# Patient Record
Sex: FEMALE | Race: BLACK OR AFRICAN AMERICAN
[De-identification: names, ages, dates, MRNs, and addresses within clinical notes are randomized per-mention and may not be internally consistent; named-entity substitution may affect disease eponyms.]

---

## 2020-10-27 ENCOUNTER — HOSPITAL ENCOUNTER (EMERGENCY)
Dept: HOSPITAL 97 - ER | Age: 20
Discharge: HOME | End: 2020-10-27
Payer: SELF-PAY

## 2020-10-27 VITALS — OXYGEN SATURATION: 100 % | TEMPERATURE: 98.2 F

## 2020-10-27 VITALS — DIASTOLIC BLOOD PRESSURE: 84 MMHG | SYSTOLIC BLOOD PRESSURE: 142 MMHG

## 2020-10-27 DIAGNOSIS — J30.9: Primary | ICD-10-CM

## 2020-10-27 PROCEDURE — 99284 EMERGENCY DEPT VISIT MOD MDM: CPT

## 2020-10-27 PROCEDURE — 87804 INFLUENZA ASSAY W/OPTIC: CPT

## 2020-10-27 PROCEDURE — 96372 THER/PROPH/DIAG INJ SC/IM: CPT

## 2020-10-27 NOTE — ER
Nurse's Notes                                                                                     

 [facility]                                                                 

Name: Michaela Toscano                                                                              

Age: 20 yrs                                                                                       

Sex: Female                                                                                       

: 2000                                                                                   

MRN: [id number]                                                                                   

Arrival Date: 10/27/2020                                                                          

Time: 10:                                                                                       

Account#: [id number]                                                                            

Bed 23                                                                                            

Private MD:                                                                                       

Diagnosis: Allergic rhinitis, unspecified                                                         

                                                                                                  

Presentation:                                                                                     

10/27                                                                                             

10:44 Chief complaint: Patient states: has had allergy symptoms since the weekend, has        iw  

      congestion, sneezing, also has asthma and had increased difficulty breathing. Risk          

      Assessment: Do you want to hurt yourself or someone else? Patient reports no desire to      

      harm self or others.                                                                        

10:44 Method Of Arrival: Ambulatory                                                           iw  

10:44 Acuity: CLARE 4                                                                           iw  

10:45 Ebola Screen: Patient negative for fever greater than or equal to 101.5 degrees         iw  

      Fahrenheit, and additional compatible Ebola Virus Disease symptoms Patient denies           

      exposure to infectious person. Patient denies travel to an Ebola-affected area in the       

      21 days before illness onset. No symptoms or risks identified at this time. Initial         

      Sepsis Screen: Does the patient meet any 2 criteria? No. Patient's initial sepsis           

      screen is negative. Does the patient have a suspected source of infection? No.              

      Patient's initial sepsis screen is negative. Onset of symptoms was 2020.        

12:40 Coronavirus screen: At this time, the client does not indicate any symptoms associated  jd3 

      with coronavirus-19.                                                                        

                                                                                                  

OB/GYN:                                                                                           

12:40 LMP N/A - Irregular menses                                                              jd3 

                                                                                                  

Historical:                                                                                       

- Allergies:                                                                                      

10:46 No Known Allergies;                                                                     iw  

- Home Meds:                                                                                      

10:46 None [Active];                                                                          iw  

- PMHx:                                                                                           

10:46 Asthma;                                                                                 iw  

- PSHx:                                                                                           

10:46 Knee surgery;                                                                           iw  

                                                                                                  

- Immunization history:: Adult Immunizations.                                                     

- Social history:: Smoking status: Patient denies any tobacco usage or history of.                

                                                                                                  

                                                                                                  

Screenin:40 Abuse screen: Denies threats or abuse. Nutritional screening: No deficits noted.        jd3 

      Tuberculosis screening: No symptoms or risk factors identified. Fall Risk Ambulatory        

      Aid- None/Bed Rest/Nurse Assist (0 pts). Gait- Normal/Bed Rest/Wheelchair (0 pts)           

      Mental Status- Oriented to own ability (0 pts). Total Paiz Fall Scale indicates No         

      Risk (0-24 pts).                                                                            

                                                                                                  

Assessment:                                                                                       

10:47 General: Appears in no apparent distress. Behavior is calm, cooperative. Pain: Denies   iw  

      pain. Neuro: Level of Consciousness is awake, alert, obeys commands, Oriented to            

      person, place, time, situation. Respiratory: Respiratory effort is even, unlabored,         

      Respiratory pattern is regular, symmetrical. Derm: Skin is intact, is healthy with good     

      turgor.                                                                                     

12:41 Reassessment: Patient appears in no apparent distress at this time. No changes from     jd3 

      previously documented assessment. Patient and/or family updated on plan of care and         

      expected duration. Pain level reassessed. Patient is alert, oriented x 3, equal             

      unlabored respirations, skin warm/dry/pink.                                                 

                                                                                                  

Vital Signs:                                                                                      

10:45  / 102; Pulse 72; Resp 18 S; Temp 98.2; Pulse Ox 100% on R/A;                     iw  

12:49  / 84; Pulse 71; Resp 16 S; Pulse Ox 100% on R/A;                                 jd3 

                                                                                                  

ED Course:                                                                                        

10:31 Patient arrived in ED.                                                                  ag5 

10:35 Britta Thorne FNP-C is [facility].                                                        kb  

10:35 Marquez Shelby MD is Attending Physician.                                             kb  

10:45 Triage completed.                                                                       iw  

10:46 Ara Wolfe, RN is Primary Nurse.                                                   iw  

10:46 Arm band placed on.                                                                     iw  

11:02 Flu Sent.                                                                               iw  

12:40 Patient has correct armband on for positive identification. Bed in low position. Call   jd3 

      light in reach. Side rails up X 1. Pulse ox on. NIBP on.                                    

12:40 No provider procedures requiring assistance completed. Patient did not have IV access   jd3 

      during this emergency room visit.                                                           

                                                                                                  

Administered Medications:                                                                         

11:25 Drug: Decadron 10 mg Route: IM; Site: right ventrogluteal;                              iw  

12:50 Follow up: Response: No adverse reaction                                                jd3 

                                                                                                  

                                                                                                  

Outcome:                                                                                          

12:33 Discharge ordered by MD.                                                                kb  

12:41 Condition: stable                                                                       jd3 

12:49 Discharged to home ambulatory.                                                          jd3 

12:49 Discharge instructions given to patient, Instructed on discharge instructions, follow       

      up and referral plans. medication usage, Demonstrated understanding of instructions,        

      follow-up care, medications, Prescriptions given X 1.                                       

12:50 Patient left the ED.                                                                    jd3 

                                                                                                  

Signatures:                                                                                       

Britta Thorne FNP-C FNP-Ara Neves RN                     Moncho Ocampo RN RN   j                                                  

Catherine, Ajare                                ag5                                                  

                                                                                                  

**************************************************************************************************

## 2020-10-27 NOTE — EDPHYS
Physician Documentation                                                                           

 [facility]                                                                 

Name: Michaela Toscano                                                                              

Age: 20 yrs                                                                                       

Sex: Female                                                                                       

: 2000                                                                                   

MRN: [id number]                                                                                   

Arrival Date: 10/27/2020                                                                          

Time: 10:                                                                                       

Account#: [id number]                                                                            

Bed 23                                                                                            

Private MD:                                                                                       

ED Physician Marquez Shelby                                                                      

HPI:                                                                                              

10/27                                                                                             

11:10 This 20 yrs old Black Female presents to ER via Ambulatory with complaints of Allergy   kb  

      Symptoms.                                                                                   

11:10 The patient or guardian reports difficulty breathing. Severity of symptoms: At their    kb  

      worst the symptoms were moderate, in the emergency department the symptoms are              

      unchanged. Associated signs and symptoms: Pertinent positives: rhinorrhea, Pertinent        

      negatives: chest pain, diarrhea, ear ache, fever, nausea, sore throat, vomiting. The        

      patient has experienced similar episodes in the past. The patient has not recently seen     

      a physician.                                                                                

11:10 Onset: The symptoms/episode began/occurred 2 day(s) ago. Modifying factors: The         kb  

      symptoms are alleviated by nothing, the symptoms are aggravated by nothing. Pt reports      

      she has a history of allergies and asthma. States she has not been taking her allergy       

      medication and was outside all weekend. States she started to get congested after that      

      and yesterday wasn't breathing right. Today symptoms are better, but she still has          

      nasal congestion.                                                                           

                                                                                                  

OB/GYN:                                                                                           

12:40 LMP N/A - Irregular menses                                                              jd3 

                                                                                                  

Historical:                                                                                       

- Allergies:                                                                                      

10:46 No Known Allergies;                                                                     iw  

- Home Meds:                                                                                      

10:46 None [Active];                                                                          iw  

- PMHx:                                                                                           

10:46 Asthma;                                                                                 iw  

- PSHx:                                                                                           

10:46 Knee surgery;                                                                           iw  

                                                                                                  

- Immunization history:: Adult Immunizations.                                                     

- Social history:: Smoking status: Patient denies any tobacco usage or history of.                

                                                                                                  

                                                                                                  

ROS:                                                                                              

11:09 Constitutional: Negative for fever, chills, and weight loss, Cardiovascular: Negative   kb  

      for chest pain, palpitations, and edema, Abdomen/GI: Negative for abdominal pain,           

      nausea, vomiting, diarrhea, and constipation, Back: Negative for injury and pain,           

      MS/Extremity: Negative for injury and deformity, Skin: Negative for injury, rash, and       

      discoloration, Neuro: Negative for headache, weakness, numbness, tingling, and seizure.     

11:09 ENT: Positive for rhinorrhea, sinus congestion.                                             

11:09 Respiratory: Positive for shortness of breath, Negative for cough, dyspnea on exertion,     

      hemoptysis, orthopnea, pleurisy, sputum production, wheezing.                               

                                                                                                  

Exam:                                                                                             

11:09 Constitutional:  This is a well developed, well nourished patient who is awake, alert,  kb  

      and in no acute distress. Head/Face:  Normocephalic, atraumatic. ENT:  Nares patent. No     

      nasal discharge, no septal abnormalities noted.  Tympanic membranes are normal and          

      external auditory canals are clear.  Oropharynx with no redness, swelling, or masses,       

      exudates, or evidence of obstruction, uvula midline.  Mucous membranes moist. Neck:         

      Trachea midline, no thyromegaly or masses palpated, and no cervical lymphadenopathy.        

      Supple, full range of motion without nuchal rigidity, or vertebral point tenderness.        

      No Meningismus. Chest/axilla:  Normal chest wall appearance and motion.  Nontender with     

      no deformity.  No lesions are appreciated. Cardiovascular:  Regular rate and rhythm         

      with a normal S1 and S2.  No gallops, murmurs, or rubs.  Normal PMI, no JVD.  No pulse      

      deficits. Respiratory:  Lungs have equal breath sounds bilaterally, clear to                

      auscultation and percussion.  No rales, rhonchi or wheezes noted.  No increased work of     

      breathing, no retractions or nasal flaring. Abdomen/GI:  Soft, non-tender, with normal      

      bowel sounds.  No distension or tympany.  No guarding or rebound.  No evidence of           

      tenderness throughout. Skin:  Warm, dry with normal turgor.  Normal color with no           

      rashes, no lesions, and no evidence of cellulitis. MS/ Extremity:  Pulses equal, no         

      cyanosis.  Neurovascular intact.  Full, normal range of motion. Neuro:  Awake and           

      alert, GCS 15, oriented to person, place, time, and situation.  Cranial nerves II-XII       

      grossly intact.  Motor strength 5/5 in all extremities.  Sensory grossly intact.            

      Cerebellar exam normal.  Normal gait.                                                       

                                                                                                  

Vital Signs:                                                                                      

10:45  / 102; Pulse 72; Resp 18 S; Temp 98.2; Pulse Ox 100% on R/A;                     iw  

12:49  / 84; Pulse 71; Resp 16 S; Pulse Ox 100% on R/A;                                 jd3 

                                                                                                  

MDM:                                                                                              

10:46 Patient medically screened.                                                             kb  

11:09 Data reviewed: vital signs, nurses notes. Data interpreted: Pulse oximetry: on room air kb  

      is 100 %. Interpretation: normal.                                                           

11:59 ED course: Lab says it will be another 30 minutes before flu test is resulted. .        kb  

12:33 Counseling: I had a detailed discussion with the patient and/or guardian regarding: the kb  

      historical points, exam findings, and any diagnostic results supporting the                 

      discharge/admit diagnosis, lab results, the need for outpatient follow up, a family         

      practitioner, to return to the emergency department if symptoms worsen or persist or if     

      there are any questions or concerns that arise at home.                                     

                                                                                                  

10/27                                                                                             

10:46 Order name: Flu                                                                           

10/27                                                                                             

12:30 Order name: Influenza Screen (A ; Complete Time: 12:33                                  EDMS

                                                                                                  

Administered Medications:                                                                         

11:25 Drug: Decadron 10 mg Route: IM; Site: right ventrogluteal;                                

12:50 Follow up: Response: No adverse reaction                                                jd3 

                                                                                                  

                                                                                                  

Disposition:                                                                                      

10/28                                                                                             

11:54 Co-signature as Attending Physician, Marquez Shelby MD I agree with the assessment and  agnieszka 

      plan of care.                                                                               

                                                                                                  

Disposition:                                                                                      

10/27/20 12:33 Discharged to Home. Impression: Allergic rhinitis, unspecified.                    

- Condition is Stable.                                                                            

- Discharge Instructions: Allergies, Easy-to-Read.                                                

- Prescriptions for Albuterol Sulfate 90 mcg/actuation - inhale 1-2 puff by INHALATION            

  route every 4-6 hours; 1 Inhaler.                                                               

- Medication Reconciliation Form, Thank You Letter, Antibiotic Education, Prescription            

  Opioid Use, Work release form form.                                                             

- Follow up: Emergency Department; When: As needed; Reason: Worsening of condition.               

  Follow up: Private Physician; When: 2 - 3 days; Reason: Recheck today's complaints,             

  Continuance of care, Re-evaluation by your physician.                                           

                                                                                                  

                                                                                                  

                                                                                                  

Signatures:                                                                                       

Dispatcher [employer]                           [facility]                                                 

Britta Thorne, Marquez Joyce MD MD cha Williams, Irene, RN                     Moncho Ocampo RN                    RN   jd3                                                  

                                                                                                  

Corrections: (The following items were deleted from the chart)                                    

10/27                                                                                             

12:50 12:33 10/27/2020 12:33 Discharged to Home. Impression: Allergic rhinitis, unspecified.  jd3 

      Condition is Stable. Forms are Medication Reconciliation Form, Thank You Letter,            

      Antibiotic Education, Prescription Opioid Use. Follow up: Emergency Department; When:       

      As needed; Reason: Worsening of condition. Follow up: Private Physician; When: 2 - 3        

      days; Reason: Recheck today's complaints, Continuance of care, Re-evaluation by your        

      physician. kb                                                                               

                                                                                                  

**************************************************************************************************

## 2020-10-27 NOTE — XMS REPORT
Continuity of Care Document

                           Created on:2020



Patient:JOANNA WINSTON

Sex:Female

:2000

External Reference #:[id number]





Demographics







                          Address                   [address]



                                                    [place] [address]

 

                          Home Phone                [phone]

 

                          Preferred Language        en-US

 

                          Marital Status            Unknown

 

                          Hindu Affiliation     Unknown

 

                          Race                      Unknown

 

                          Ethnic Group              Unknown









Author







                          Organization              [id number]









Care Team Providers







                    Name                Role                Phone

 

                    [id number] Unavailable         Un

available









Problems







          Problem   Status    Onset     Classification Date      Comments  Sourc

e



                              Date                Reported            



                                                                      



                                                                      

 

          Morbid    Active              Problem   10/11/2019            [facility]

l



          obesity                                                     Group



          (disorder)                                                   



                                                                      



                                                                      







Medications







        Medication Details Route   Status  Patient Ordering Order   Source



                                        Instructions Provider Date    



                                                                



                                                                



                                                                

 

        Rocephin 1,000 mg,         Inactive                 10/08/2 



                Route: IM,                                 019     [facility]



                [facility],                                           Group



                Dosing                                          



                Weight                                          



                137.472,                                         



                kg, Start                                         



                date:                                           



                10/08/19                                         



                11:22:00                                         



                CDT, Stop                                         



                date:                                           



                10/08/19                                         



                11:22:00                                         



                CDT                                             



                                                                



                                                                

 

        Dexamethasone 10 mg,          Inactive                 10/08/2 



                Route: IM,                                 019     Medical



                [facility],                                           Group



                Dosing                                          



                Weight                                          



                137.472,                                         



                kg,                                             



                Priority:                                         



                STAT, Start                                         



                date:                                           



                10/08/19                                         



                11:22:00                                         



                CDT, Stop                                         



                date:                                           



                10/08/19                                         



                11:22:00                                         



                CDT                                             



                                                                



                                                                

 

        amoxicillin 875 875 mg = 1         Active                  10/08/2 



        mg oral tablet tab, PO,                                 019     Medical



                Q12H, X 10                                         Group



                day, # 20                                         



                tab, 0                                          



                Refill(s),                                         



                Pharmacy:                                         



                [facility]                                         



                [facility]                                         



                [id number]                                            



                                                                



                                                                

 

        200 ACTUAT 1 puff,         Active                   



        Albuterol 0.09 INHALER,                                 019     Medical



        MG/ACTUAT Q4H, PRN                                         Group



        Metered Dose for                                             



        Inhaler [ProAir wheezing, #                                         



        HFA]    8.5 gm, 0                                         



                Refill(s),                                         



                Pharmacy:                                         



                [facility]                                         



                [facility]                                         



                [id number]                                            



                                                                



                                                                

 

        Azithromycin 5 See             Active                   



        Day Dose Pack Instruction                                 019     [facility]

l



        250 mg oral s, Take 2                                         Group



        tablet  tablets by                                         



                mouth the                                         



                first day                                         



                then 1                                          



                tablet by                                         



                mouth days                                         



                2-5., X 5                                         



                day, # 6                                         



                tab, 0                                          



                Refill(s)                                         



                                                                



                                                                

 

        {21     See             Active                   



        (Methylprednisol Instruction                                 019     Med

ical



        one 4 MG Oral s, PO, Take                                         Group



        Tablet [Medrol]) by mouth as                                         



        } Pack [Medrol directed on                                         



        Dosepak] label., # 1                                         



                Pack, 0                                         



                Refill(s),                                         



                Pharmacy:                                         



                [facility]                                         



                [facility]acy                                         



                [id number]                                            



                                                                



                                                                

 

        albuterol 90 2 puff,         Active                   



        mcg/inh INHALATION,                                 019     Medical



        inhalation QID, 0                                          Group



        aerosol Refill(s)                                         



                                                                



                                                                







Allergies, Adverse Reactions, Alerts







        Substance Category Reaction Severity Reaction Status  Date    Comments S

ource



                                        type            Reported         



                                                                        



                                                                        



                                                                        

 

        No Known Assertion                 Drug                            



        Medication                         allergy                         Medic

al



        Allergies                                                         Group



                                                                        



                                                                        







Immunizations







                                        No Data Provided for This Section







Results







                                        No Data Provided for This Section







Pathology Reports







                                        No Data Provided for This Section







Diagnostic Reports







                Report          Value           Date            Source



                                                                

 

                Chest 2 views DX Clinical Indication:  - cough and chest pain. 0

2019      

[facility]



                                Comparison: None                 



                                Findings:                       



                                Frontal and lateral views of the chest obtained.

                 



                                The cardiac silhouette is normal.               

  



                                There is no lobar consolidation, effusion, edema

.  No pneumothorax.                 



                                No acute osseous abnormality.                 



                                IMPRESSION:                     



                                No acute cardiopulmonary abnormality.           

      



                                                                



                                                                



                                SL:  [id number]                    



                                                                







Consultation Notes







                                        No Data Provided for This Section







Discharge Summaries







                                        No Data Provided for This Section







History and Physicals







                                        No Data Provided for This Section







Vital Signs







             Vital Sign   Value        Date         Comments     Source



                                                                 

 

             Systolic (mm Hg) 137          10/08/2019                 Medical 

Group



                                                                 



                                                                 

 

             Diastolic (mm Hg) 96           10/08/2019                 Medical

 Group



                                                                 



                                                                 

 

             Heart Rate   86           10/08/2019                 [facility] Gr[facility]

p



                                                                 



                                                                 

 

             Temperature Oral (F) 98.3 F       10/08/2019                 Medi

[facility] Group



                                                                 



                                                                 

 

             Height       172.72 cm    10/08/2019                 [facility]

p



                                                                 



                                                                 

 

             Weight       137.472      10/08/2019                 [facility]

p



                                                                 



                                                                 

 

             BMI Calculated 46.08        10/08/2019                 Medical Gr

oup



                                                                 



                                                                 

 

             BMI Calculated 47.5         2019                 Medical Gr

oup



                                                                 



                                                                 

 

             Weight       145.909      2019                 [facility] Gr[facility]

p



                                                                 



                                                                 

 

             Height       175.26 cm    2019                 [facility] Gr[facility]

p



                                                                 



                                                                 

 

             Systolic (mm Hg) 138          2019                 Medical 

Group



                                                                 



                                                                 

 

             Diastolic (mm Hg) 91           2019                 Medical

 Group



                                                                 



                                                                 

 

             Temperature Oral (F) 98.6 F       2019                 Medi

jatin[facility] Group



                                                                 



                                                                 

 

             Heart Rate   88           2019                 [facility] Gr[facility]

p



                                                                 



                                                                 







Encounters







       Location Location Encounter Encounter Reason Attending ADM    [place]     [facility]

 Source



              Details Type   Number For    Provider Date   Date          



                                   Visit                              



                                                                      



                                                                      



                                                                      

 

                     Outpatient [id number]                 [facility]                  [facility] Urgent        Outpatient [id number]              

   



       Care                               [place]         [facility]



       [facility]                                                          Group



       [place]                                                           



                                                                      



                                                                      

 

                     Outpatient [id number]        NISHA          Active 

[facility]



                                                          [facility]          [facility] [id number]        Nisha          





       Primary        Pre-Reg               [facility]         Medic

al



       Care                                                           Group



       [facility]                                                          



       [place]                                                           



                                                                      



                                                                      

 

                     Outpatient [id number]        [facility]         [facility]

[facility]arm[facility]



                                                          [facility] Urgent        Outpatient [id number]        [facility] 10/08  10/09  

       



       Care                                        Medical



       Clear                                                          Group



       [place]                                                           



                                                                      



                                                                      







Procedures







                                        No Data Provided for This Section







Assessment and Plan







                                        No Data Provided for This Section







Plan of Care







                                        No Data Provided for This Section







Social History







                    Social History      Date                Source



                                                            

 

                    Social History TypeResponse 10/08/2019           [facility]

roup



                    Smoking Status                          



                                        Never smoker; Ready to change: No; Maryann

rns about tobacco use in household: No; 

Exposure to Tobacco Smoke None; Cigarette Smoking Last 365 Days No; Reg Smoking 
Cessation Counseling No                             



                    entered on: 10/8/19                     



                                                            







Family History







                                        No Data Provided for This Section







Advance Directives







                                        No Data Provided for This Section







Functional Status







                                        No Data Provided for This Section

## 2022-07-13 ENCOUNTER — HOSPITAL ENCOUNTER (EMERGENCY)
Dept: HOSPITAL 97 - ER | Age: 22
LOS: 1 days | Discharge: HOME | End: 2022-07-14
Payer: SELF-PAY

## 2022-07-13 DIAGNOSIS — J06.9: ICD-10-CM

## 2022-07-13 DIAGNOSIS — U07.1: Primary | ICD-10-CM

## 2022-07-13 PROCEDURE — 99282 EMERGENCY DEPT VISIT SF MDM: CPT

## 2022-07-13 PROCEDURE — 87804 INFLUENZA ASSAY W/OPTIC: CPT

## 2022-07-14 VITALS — TEMPERATURE: 98.2 F | OXYGEN SATURATION: 100 %

## 2022-07-14 VITALS — DIASTOLIC BLOOD PRESSURE: 99 MMHG | SYSTOLIC BLOOD PRESSURE: 137 MMHG

## 2022-07-14 NOTE — EDPHYS
Physician Documentation                                                                           

 [facility]                                                                 

Name: Michaela Toscano                                                                              

Age: 22 yrs                                                                                       

Sex: Female                                                                                       

: 2000                                                                                   

MRN: [id number]                                                                                   

Arrival Date: 2022                                                                          

Time: 20:56                                                                                       

Account#: [id number]                                                                            

Bed Waiting                                                                                       

Private MD:                                                                                       

ED Physician Marlon Boyd                                                                         

HPI:                                                                                              

                                                                                             

23:40 This 22 yrs old Black Female presents to ER via Ambulatory with complaints of Allergy   cp  

      Symptoms, Breathing Difficulty, Cough.                                                      

                                                                                                  

OB/GYN:                                                                                           

21:28 LMP 2022                                                                           vc1 

                                                                                                  

Historical:                                                                                       

- Allergies:                                                                                      

21:29 No Known Allergies;                                                                     vc1 

- Home Meds:                                                                                      

21:29 None [Active];                                                                          vc1 

- PMHx:                                                                                           

21:29 Asthma;                                                                                 vc1 

- PSHx:                                                                                           

21:29 None;                                                                                   vc1 

                                                                                                  

- Immunization history:: Adult Immunizations up to date, Client reports receiving the             

  2nd dose of the Covid vaccine.                                                                  

- Social history:: Smoking status: Patient denies any tobacco usage or history of.                

                                                                                                  

                                                                                                  

ROS:                                                                                              

23:45 Constitutional: Negative for body aches, chills, fever, poor PO intake.                 cp  

23:45 Eyes: Negative for injury, pain, redness, and discharge.                                cp  

23:45 ENT: Positive for rhinorrhea, sore throat, Negative for drainage from ear(s), ear pain,     

      difficulty swallowing, difficulty handling secretions.                                      

23:45 Cardiovascular: Negative for chest pain, palpitations.                                      

23:45 Respiratory: Positive for cough.                                                            

23:45 Abdomen/GI: Negative for abdominal pain, nausea, vomiting, and diarrhea.                    

23:45 Neuro: Negative for altered mental status, headache, weakness.                              

23:45 All other systems are negative.                                                             

                                                                                                  

Exam:                                                                                             

23:50 Constitutional: The patient appears in no acute distress, alert, awake, non-toxic, well cp  

      developed, well nourished.                                                                  

23:50 Head/Face:  Normocephalic, atraumatic.                                                  cp  

23:50 Eyes: Periorbital structures: appear normal, Conjunctiva: normal, no exudate, no            

      injection, Sclera: no appreciated abnormality, Lids and lashes: appear normal,              

      bilaterally.                                                                                

23:50 ENT: External ear(s): are unremarkable, Ear canal(s): are normal, clear, TM's: bulging,     

      is not appreciated, bilaterally, dullness, bilaterally, erythema, is not appreciated,       

      bilaterally, Nose: is normal, Mouth: Lips: moist, Oral mucosa: pink and intact, moist,      

      Posterior pharynx: Airway: no evidence of obstruction, patent, Tonsils: no enlargement,     

      no exudate, erythema, that is mild, exudate, is not appreciated.                            

23:50 Neck: ROM/movement: is normal, is supple, without pain, no range of motions                 

      limitations, no meningismus.                                                                

23:50 Chest/axilla: Inspection: normal.                                                           

23:50 Cardiovascular: Rate: normal, Rhythm: regular.                                              

23:50 Respiratory: the patient does not display signs of respiratory distress,  Respirations:     

      normal, no use of accessory muscles, no retractions, labored breathing, is not present,     

      Breath sounds: are clear throughout, no decreased breath sounds, no stridor, no             

      wheezing.                                                                                   

23:50 Abdomen/GI: Exam negative for discomfort, distension, guarding, Inspection: abdomen         

      appears normal.                                                                             

23:50 Back: pain, is absent, ROM is normal.                                                       

23:50 Skin: no rash present.                                                                      

                                                                                                  

Vital Signs:                                                                                      

21:22 Pulse 89; Resp 20; Temp 98.2; Pulse Ox 100% ; Weight 145.15 kg; Height 5 ft. 9 in.      vc1 

      (175.26 cm); Pain 0/10;                                                                     

21:28  / 99;                                                                            vc1 

21:22 Body Mass Index 47.26 (145.15 kg, 175.26 cm)                                            vc1 

                                                                                                  

MDM:                                                                                              

23:50 Differential diagnosis: bronchitis, flu, URI, Covid-19.                                 cp  

                                                                                             

00:15 Patient medically screened.                                                             cp  

00:15 Data reviewed: vital signs, nurses notes, lab test result(s), and as a result, I will   cp  

      discharge patient.                                                                          

00:15 ED course: VSS. Patient appears non-toxic and no signs of respiratory distress. Patient cp  

      declines to continue to wait for results of COVID-19 test.                                  

                                                                                                  

                                                                                             

21:28 Order name: Flu; Complete Time: 23:49                                                   vc1 

                                                                                             

23:31 Order name: SARS-COV-2 RT PCR; Complete Time: 23:49                                     EDMS

                                                                                                  

Administered Medications:                                                                         

No medications were administered                                                                  

                                                                                                  

                                                                                                  

Disposition Summary:                                                                              

22 00:15                                                                                    

Discharge Ordered                                                                                 

      Location: Home                                                                          cp  

      Problem: new                                                                            cp  

      Symptoms: are unchanged                                                                 cp  

      Condition: Stable                                                                       cp  

      Diagnosis                                                                                   

        - Acute upper respiratory infection, unspecified                                      cp  

      Followup:                                                                               cp  

        - With: Private Physician                                                                  

        - When: 2 - 3 days                                                                         

        - Reason: Worsening of condition                                                           

      Discharge Instructions:                                                                     

        - Discharge Summary Sheet                                                             cp  

        - Viral Respiratory Infection                                                         cp  

      Forms:                                                                                      

        - Medication Reconciliation Form                                                      cp  

        - Thank You Letter                                                                    cp  

        - Antibiotic Education                                                                cp  

        - Prescription Opioid Use                                                             cp  

      Prescriptions:                                                                              

        - Bromfed DM 2-30-10 mg/5 mL Oral syrup                                                    

            - take 10 milliliter by ORAL route every 6 hours; 180 milliliter; Refills: 0,     cp  

      Product Selection Permitted                                                                 

Addendum:                                                                                         

2022                                                                                        

     21:37 Co-signature as Attending Physician, Marlon BROUSSARD was immediately available on-site m
s3

           in the Emergency Department for consultation in the care of the patient. .             

                                                                                                  

Signatures:                                                                                       

Dispatcher [employer]                           EDMS                                                 

Marquez Xavier PA PA cp Sims, Marcus, DO DO   ms3                                                  

Carla Glass RN                    RN   vc1                                                  

                                                                                                  

Corrections: (The following items were deleted from the chart)                                    

                                                                                             

23:31 21:28 COVID,FLU,RSV CPL+MR.LAB.BRZ ordered. EDMS                                        EDMS

                                                                                             

23:50  23:35 Differential diagnosis: bronchitis, flu, URI, Covid-19 cp                   cp  

                                                                                                  

**************************************************************************************************

## 2022-07-14 NOTE — ER
Nurse's Notes                                                                                     

 [facility]                                                                 

Name: Michaela Toscano                                                                              

Age: 22 yrs                                                                                       

Sex: Female                                                                                       

: 2000                                                                                   

MRN: [id number]                                                                                   

Arrival Date: 2022                                                                          

Time: 20:56                                                                                       

Account#: [id number]                                                                            

Bed Waiting                                                                                       

Private MD:                                                                                       

Diagnosis: Acute upper respiratory infection, unspecified                                         

                                                                                                  

Presentation:                                                                                     

                                                                                             

21:22 Chief complaint: Patient states: " I usually have allergies and I have been taking my   vc1 

      allergies since . It started in my nose and now it is draining down to my throat      

      and making me cough. I took mucinex and it helped at first but I took it today and it       

      didn't seem like it helped today.". Coronavirus screen: Vaccine status: Patient reports     

      receiving the 2nd dose of the covid vaccine. Pfizer congestion, cough unrelated to          

      allergies, fever, runny nose. Ebola Screen: No symptoms or risks identified at this         

      time.                                                                                       

21:22 Method Of Arrival: Ambulatory                                                           vc1 

21:26 Initial Sepsis Screen: Does the patient meet any 2 criteria? No. Patient's initial      vc1 

      sepsis screen is negative. Does the patient have a suspected source of infection? No.       

      Patient's initial sepsis screen is negative. Risk Assessment: Do you want to hurt           

      yourself or someone else? Patient reports no desire to harm self or others. Onset of        

      symptoms was July 10, 2022.                                                                 

21:26 Acuity: CLARE 4                                                                           vc1 

                                                                                                  

Triage Assessment:                                                                                

                                                                                             

00:26 General: Appears in no apparent distress. uncomfortable, ill, Behavior is calm,         vc1 

      cooperative, appropriate for age. Pain: Denies pain.                                        

                                                                                                  

OB/GYN:                                                                                           

                                                                                             

21:28 LMP 2022                                                                           vc1 

                                                                                                  

Historical:                                                                                       

- Allergies:                                                                                      

21:29 No Known Allergies;                                                                     vc1 

- Home Meds:                                                                                      

21:29 None [Active];                                                                          vc1 

- PMHx:                                                                                           

21:29 Asthma;                                                                                 vc1 

- PSHx:                                                                                           

21:29 None;                                                                                   vc1 

                                                                                                  

- Immunization history:: Adult Immunizations up to date, Client reports receiving the             

  2nd dose of the Covid vaccine.                                                                  

- Social history:: Smoking status: Patient denies any tobacco usage or history of.                

                                                                                                  

                                                                                                  

Screenin:30 Abuse screen: Denies threats or abuse. Nutritional screening: No deficits noted.        vc1 

      Tuberculosis screening: No symptoms or risk factors identified.                             

21:30 Fall Risk None identified.                                                              vc1 

                                                                                                  

Vital Signs:                                                                                      

21:22 Pulse 89; Resp 20; Temp 98.2; Pulse Ox 100% ; Weight 145.15 kg; Height 5 ft. 9 in.      vc1 

      (175.26 cm); Pain 0/10;                                                                     

21:28  / 99;                                                                            vc1 

21:22 Body Mass Index 47.26 (145.15 kg, 175.26 cm)                                            vc1 

                                                                                                  

ED Course:                                                                                        

20:56 Patient arrived in ED.                                                                  ja2 

20:57 Marquez Xavier PA is PHCP.                                                                cp  

20:57 Marlon Boyd DO is Attending Physician.                                                cp  

21:27 Triage completed.                                                                       vc1 

21:28 Arm band placed on right wrist.                                                         vc1 

                                                                                             

00:27 Patient has correct armband on for positive identification.                             vc1 

00:27 No provider procedures requiring assistance completed. Patient did not have IV access   vc1 

      during this emergency room visit.                                                           

                                                                                                  

Administered Medications:                                                                         

No medications were administered                                                                  

                                                                                                  

                                                                                                  

Medication:                                                                                       

00:27 VIS not applicable for this client.                                                     vc1 

                                                                                                  

Outcome:                                                                                          

00:15 Discharge ordered by MD.                                                                cp  

00:27 Discharged to home ambulatory.                                                          vc1 

00:27 Condition: good                                                                             

00:27 Discharge instructions given to patient, Instructed on discharge instructions, follow       

      up and referral plans. medication usage, Demonstrated understanding of instructions,        

      follow-up care, medications, Prescriptions given X 1.                                       

00:27 Patient left the ED.                                                                    vc1 

                                                                                                  

Signatures:                                                                                       

Marquez Xavier PA PA cp Alexander, Jessica ja2                                                  

Carla Glass RN                    RN   vc1                                                  

                                                                                                  

**************************************************************************************************

## 2023-10-18 ENCOUNTER — HOSPITAL ENCOUNTER (EMERGENCY)
Dept: HOSPITAL 97 - ER | Age: 23
Discharge: HOME | End: 2023-10-18
Payer: SELF-PAY

## 2023-10-18 DIAGNOSIS — J06.9: ICD-10-CM

## 2023-10-18 DIAGNOSIS — J20.9: Primary | ICD-10-CM

## 2023-10-18 DIAGNOSIS — Z20.822: ICD-10-CM

## 2023-10-18 PROCEDURE — 87811 SARS-COV-2 COVID19 W/OPTIC: CPT

## 2023-10-18 PROCEDURE — 87804 INFLUENZA ASSAY W/OPTIC: CPT

## 2023-10-18 PROCEDURE — 36415 COLL VENOUS BLD VENIPUNCTURE: CPT

## 2023-10-18 PROCEDURE — 71046 X-RAY EXAM CHEST 2 VIEWS: CPT

## 2023-10-18 NOTE — RAD REPORT
EXAM DESCRIPTION:  RAD - Chest Pa And Lat (2 Views) - 10/18/2023 9:10 pm

 

CLINICAL HISTORY:  Congestion;Chest pain;Cough

 

COMPARISON:  No comparisons

 

TECHNIQUE:  PA and lateral views of the chest were obtained.

 

FINDINGS:  The lungs are clear. Heart size is normal and central vasculature is within normal limits.
 No pleural effusion or pneumothorax seen. No acute bony finding noted.

 

IMPRESSION:  No acute cardiopulmonary process.

## 2023-10-18 NOTE — ER
Nurse's Notes                                                                                     

 [facility]                                                                 

Name: Michaela Toscano                                                                              

Age: 23 yrs                                                                                       

Sex: Female                                                                                       

: 2000                                                                                   

MRN: [id number]                                                                                   

Arrival Date: 10/18/2023                                                                          

Time: 20:14                                                                                       

Account#: [id number]                                                                            

Bed 13                                                                                            

Private MD:                                                                                       

Diagnosis: Acute bronchitis, unspecified;Acute upper respiratory infection, unspecified           

                                                                                                  

Presentation:                                                                                     

10/18                                                                                             

20:33 Chief complaint: Patient states: she has been sick for the past week with a painful     ap3 

      cough. patient states that her spouse informed her that she might be getting worse          

      instead of better. Coronavirus screen: Client presents with at least one sign or            

      symptom that may indicate coronavirus-19. Ebola Screen: No symptoms or risks identified     

      at this time. Initial Sepsis Screen: Does the patient meet any 2 criteria? No.              

      Patient's initial sepsis screen is negative. Does the patient have a suspected source       

      of infection? No. Patient's initial sepsis screen is negative. Risk Assessment: Do you      

      want to hurt yourself or someone else? Patient reports no desire to harm self or            

      others. Onset of symptoms was 2023.                                             

20:33 Method Of Arrival: Ambulatory                                                           ap3 

20:33 Acuity: CLARE 4                                                                           ap3 

                                                                                                  

Triage Assessment:                                                                                

20:34 General: Appears ill, Behavior is calm, cooperative, appropriate for age. Pain:         ap3 

      Complains of pain in generalized body aches. Neuro: Level of Consciousness is awake,        

      alert, obeys commands, Oriented to person, place, time, situation. Cardiovascular:          

      Patient's skin is warm and dry. Respiratory: Reports cough that is Airway is patent         

      Respiratory effort is even, unlabored, Respiratory pattern is regular, symmetrical.         

                                                                                                  

Historical:                                                                                       

- Allergies:                                                                                      

20:34 No Known Allergies;                                                                     ap3 

- PMHx:                                                                                           

20:34 Asthma;                                                                                 ap3 

                                                                                                  

- Immunization history:: Client reports receiving the 2nd dose of the Covid vaccine.              

- Social history:: Smoking status: Patient denies any tobacco usage or history of.                

                                                                                                  

                                                                                                  

Screenin:35 [facility] ED Fall Risk Assessment (Adult) History of falling in the last 3 months,       ap3 

      including since admission No falls in past 3 months (0 pts). Abuse screen: Denies           

      threats or abuse. Nutritional screening: No deficits noted. Tuberculosis screening: No      

      symptoms or risk factors identified.                                                        

                                                                                                  

Assessment:                                                                                       

21:00 General: Appears comfortable, Behavior is calm, cooperative. Pain: Denies pain. Neuro:  rv  

      Level of Consciousness is awake, alert, obeys commands, Oriented to person, place,          

      time, situation. Cardiovascular: Capillary refill < 3 seconds Patient's skin is warm        

      and dry. Respiratory: Airway is patent Respiratory effort is even, unlabored, Breath        

      sounds with wheezes bilaterally. GI: No signs and/or symptoms were reported involving       

      the gastrointestinal system. : No signs and/or symptoms were reported regarding the       

      genitourinary system.                                                                       

21:31 Reassessment: Patient states feeling better.                                            rv  

                                                                                                  

Vital Signs:                                                                                      

20:33  / 108; Pulse 101; Resp 19; Temp 97.7; Pulse Ox 100% ; Weight 145.15 kg;          ap3 

22:06  / 78; Pulse 98; Resp 18; Temp 98; Pulse Ox 99% ;                                 rv  

                                                                                                  

Davonte Coma Score:                                                                               

21:32 Eye Response: spontaneous(4). Motor Response: obeys commands(6). Verbal Response:       rv  

      oriented(5). Total: 15.                                                                     

                                                                                                  

ED Course:                                                                                        

20:16 Patient arrived in ED.                                                                  mr  

20:18 Aneta Haddad PA-C is PHCP.                                                            sb4 

20:18 José Antonio العلي MD is Attending Physician.                                               sb4 

20:34 Triage completed.                                                                       ap3 

20:35 Arm band placed on right wrist.                                                         ap3 

21:00 Leonard Clark, RN is Primary Nurse.                                                  rv  

21:00 Patient has correct armband on for positive identification. Client placed on continuous rv  

      cardiac and pulse oximetry monitoring. NIBP monitoring applied. Cardiac monitor on.         

21:00 No provider procedures requiring assistance completed. Patient did not have IV access   rv  

      during this emergency room visit.                                                           

21:10 Chest Pa And Lat (2 Views) XRAY In Process Unspecified.                                 EDMS

                                                                                                  

Administered Medications:                                                                         

21:00 Drug: MethylPREDNISolone Sodium Succinate  mg IM once Route: IM; Site: right      rv  

      deltoid;                                                                                    

22:07 Follow up: Response: No adverse reaction                                                rv  

21:00 Drug: DuoNeb Nebulize (3:1) (2.5 mg - 0.5 mg) 3 ml Nebulizer once Route: Nebulizer;     rv  

22:07 Follow up: Response: No adverse reaction                                                rv  

                                                                                                  

                                                                                                  

Medication:                                                                                       

21:00 VIS not applicable for this client.                                                     rv  

                                                                                                  

Outcome:                                                                                          

21:45 Discharge ordered by MD.                                                                sb4 

22:07 Discharged to home ambulatory,                                                          rv  

22:07 Condition: improved                                                                         

22:07 Discharge instructions given to patient, Instructed on discharge instructions, follow       

      up and referral plans. medication usage, Demonstrated understanding of instructions,        

      follow-up care, medications, Prescriptions given X 2,                                       

22:07 Patient left the ED.                                                                    rv  

                                                                                                  

Signatures:                                                                                       

Dispatcher [employer]                           EDMS                                                 

MathewsCatarina jerome, Reg                       Reg  mr                                                   

Maricarmen Washubrn RN                    RN   ap3                                                  

Leonard Clark RN                    RN   Aneta Gonzalez PA-C                     PAJUVE sb4                                                  

                                                                                                  

**************************************************************************************************

## 2023-10-18 NOTE — EDPHYS
Physician Documentation                                                                           

 [facility]                                                                 

Name: Michaela Toscano                                                                              

Age: 23 yrs                                                                                       

Sex: Female                                                                                       

: 2000                                                                                   

MRN: [id number]                                                                                   

Arrival Date: 10/18/2023                                                                          

Time: 20:14                                                                                       

Account#: [id number]                                                                            

Bed 13                                                                                            

Private MD:                                                                                       

ED Physician José Antonio العلي                                                                        

HPI:                                                                                              

10/18                                                                                             

21:48 This 23 yrs old Black Female presents to ER via Ambulatory with complaints of Cough.    sb4 

21:48 The patient or guardian reports cough, flu symptoms.                                    sb4 

21:49 Onset: The symptoms/episode began/occurred 1 week(s) ago. Associated signs and          sb4 

      symptoms: Pertinent negatives: fever, sore throat, vomiting. The patient has not            

      experienced similar symptoms in the past. The patient has not recently seen a physician.    

                                                                                                  

Historical:                                                                                       

- Allergies:                                                                                      

20:34 No Known Allergies;                                                                     ap3 

- PMHx:                                                                                           

20:34 Asthma;                                                                                 ap3 

                                                                                                  

- Immunization history:: Client reports receiving the 2nd dose of the Covid vaccine.              

- Social history:: Smoking status: Patient denies any tobacco usage or history of.                

                                                                                                  

                                                                                                  

ROS:                                                                                              

10/19                                                                                             

02:56 Constitutional: Negative for fever, chills, and weight loss,                            sb4 

      Respiratory: Positive for cough, with no reported sputum,                                   

02:56 ENT: Positive for sinus congestion,                                                     sb4 

02:56 All other systems are negative,                                                             

                                                                                                  

Exam:                                                                                             

02:56 Constitutional:  This is a well developed, well nourished patient who is awake, alert,  sb4 

      and in no acute distress. Head/Face:  Normocephalic, atraumatic. Eyes:  Extra-ocular        

      motions intact.  Periorbital areas with no swelling, redness, or edema. ENT:  Mucous        

      membranes moist. Cardiovascular:  Regular rate and rhythm with a normal S1 and S2.          

      Respiratory:  Lungs have equal breath sounds bilaterally, clear to auscultation and         

      percussion.  No rales, rhonchi or wheezes noted.  No increased work of breathing, no        

      retractions or nasal flaring. Abdomen/GI:  Soft, non-tender, no distension. Skin:           

      Warm, dry with normal turgor.  Normal color with no rashes, no lesions, and no evidence     

      of cellulitis. MS/ Extremity:  Pulses equal, no cyanosis.  Neurovascular intact.  Full,     

      normal range of motion. Neuro:  Awake and alert, GCS 15, oriented to person, place,         

      time, and situation. Motor strength 5/5 in all extremities. Sensory grossly intact.         

                                                                                                  

Vital Signs:                                                                                      

10/18                                                                                             

20:33  / 108; Pulse 101; Resp 19; Temp 97.7; Pulse Ox 100% ; Weight 145.15 kg;          ap3 

22:06  / 78; Pulse 98; Resp 18; Temp 98; Pulse Ox 99% ;                                 rv  

                                                                                                  

Davonte Coma Score:                                                                               

21:32 Eye Response: spontaneous(4). Motor Response: obeys commands(6). Verbal Response:       rv  

      oriented(5). Total: 15.                                                                     

                                                                                                  

MDM:                                                                                              

20:22 Patient medically screened.                                                             sb4 

10/19                                                                                             

02:56 Differential Diagnosis: Bronchitis Influenza Upper Respiratory Infection Asthma         sb4 

      Exacerbation Viral Syndrome Pneumonia. Data reviewed: vital signs, nurses notes, lab        

      test result(s), radiologic studies, and as a result, I will discharge patient.              

      Counseling: I had a detailed discussion with the patient and/or guardian regarding the      

      historical points, exam findings, and any diagnostic results supporting the                 

      discharge/admit diagnosis, lab results, radiology results, to return to the emergency       

      department if symptoms worsen or persist or if there are any questions or concerns that     

      arise at home.                                                                              

                                                                                                  

10/18                                                                                             

20:42 Order name: SARS RAPID; Complete Time: 21:28                                            sb4 

10/18                                                                                             

20:42 Order name: Flu; Complete Time: 21:36                                                   sb4 

10/18                                                                                             

20:42 Order name: Chest Pa And Lat (2 Views) XRAY; Complete Time: 21:28                       sb4 

                                                                                                  

Administered Medications:                                                                         

10/18                                                                                             

21:00 Drug: MethylPREDNISolone Sodium Succinate  mg IM once Route: IM; Site: right      rv  

      deltoid;                                                                                    

22:07 Follow up: Response: No adverse reaction                                                rv  

21:00 Drug: DuoNeb Nebulize (3:1) (2.5 mg - 0.5 mg) 3 ml Nebulizer once Route: Nebulizer;     rv  

22:07 Follow up: Response: No adverse reaction                                                rv  

                                                                                                  

                                                                                                  

Disposition Summary:                                                                              

10/18/23 21:45                                                                                    

Discharge Ordered                                                                                 

 Notes:       Location: Home                                                                        
  sb4

      Problem: new                                                                            sb4 

      Symptoms: have improved                                                                 sb4 

      Condition: Stable                                                                       sb4 

      Diagnosis                                                                                   

        - Acute bronchitis, unspecified                                                       sb4 

        - Acute upper respiratory infection, unspecified                                      sb4 

      Followup:                                                                               sb4 

        - With: Emergency Department                                                               

        - When: As needed                                                                          

        - Reason: Trouble breathing, Worsening of condition                                        

      Discharge Instructions:                                                                     

        - Discharge Summary Sheet                                                             sb4 

        - Acute Bronchitis, Adult                                                             sb4 

        - Upper Respiratory Infection, Adult                                                  sb4 

        - Viral Respiratory Infection                                                         sb4 

      Forms:                                                                                      

        - Medication Reconciliation Form                                                      sb4 

        - Thank You Letter                                                                    sb4 

        - Antibiotic Education                                                                sb4 

        - Prescription Opioid Use                                                             sb4 

        - Patient Portal Instructions                                                         sb4 

        - Leadership Thank You Letter                                                         sb4 

      Prescriptions:                                                                              

        - Tessalon Perles 100 mg Oral Capsule                                                      

            - take 1 capsule ORAL route every 8 hours As needed; 15 capsule; Refills: 0,      sb4 

      Product Selection Permitted                                                                 

        - Medrol (Shiraz) 4 mg Oral Tablets, Dose Pack                                                

            - take 1 tablet ORAL route as directed - follow package instructions; 1 packet;   sb4 

      Refills: 0, Product Selection Permitted                                                     

Signatures:                                                                                       

Dispatcher [employer]                           Maricarmen Moy RN                    RN   ap3                                                  

Leonard Clark RN                    RN   Aneta Gonzalez PA-C PA-C sb4                                                  

                                                                                                  

**************************************************************************************************

## 2023-10-19 VITALS — TEMPERATURE: 98 F | DIASTOLIC BLOOD PRESSURE: 78 MMHG | SYSTOLIC BLOOD PRESSURE: 146 MMHG | OXYGEN SATURATION: 99 %

## 2025-04-16 ENCOUNTER — HOSPITAL ENCOUNTER (EMERGENCY)
Dept: HOSPITAL 97 - ER | Age: 25
LOS: 1 days | Discharge: HOME | End: 2025-04-17
Payer: COMMERCIAL

## 2025-04-16 DIAGNOSIS — Z11.52: ICD-10-CM

## 2025-04-16 DIAGNOSIS — J06.9: ICD-10-CM

## 2025-04-16 DIAGNOSIS — J01.80: Primary | ICD-10-CM

## 2025-04-16 PROCEDURE — 87428 SARSCOV & INF VIR A&B AG IA: CPT

## 2025-04-16 PROCEDURE — 87070 CULTURE OTHR SPECIMN AEROBIC: CPT

## 2025-04-16 PROCEDURE — 71046 X-RAY EXAM CHEST 2 VIEWS: CPT

## 2025-04-16 PROCEDURE — 36415 COLL VENOUS BLD VENIPUNCTURE: CPT

## 2025-04-17 VITALS — OXYGEN SATURATION: 99 % | SYSTOLIC BLOOD PRESSURE: 154 MMHG | DIASTOLIC BLOOD PRESSURE: 108 MMHG
